# Patient Record
Sex: FEMALE | Race: AMERICAN INDIAN OR ALASKA NATIVE | NOT HISPANIC OR LATINO | Employment: FULL TIME | URBAN - METROPOLITAN AREA
[De-identification: names, ages, dates, MRNs, and addresses within clinical notes are randomized per-mention and may not be internally consistent; named-entity substitution may affect disease eponyms.]

---

## 2023-09-01 ENCOUNTER — HOSPITAL ENCOUNTER (OUTPATIENT)
Dept: DATA CONVERSION | Facility: HOSPITAL | Age: 48
End: 2023-09-01
Attending: ORTHOPAEDIC SURGERY | Admitting: ORTHOPAEDIC SURGERY
Payer: COMMERCIAL

## 2023-09-01 DIAGNOSIS — G56.02 CARPAL TUNNEL SYNDROME, LEFT UPPER LIMB: ICD-10-CM

## 2023-09-01 DIAGNOSIS — G56.03 CARPAL TUNNEL SYNDROME, BILATERAL UPPER LIMBS: ICD-10-CM

## 2023-09-29 VITALS — BODY MASS INDEX: 33.07 KG/M2 | WEIGHT: 168.43 LBS | HEIGHT: 60 IN

## 2023-09-30 NOTE — H&P
History of Present Illness:   Pregnant/Lactating:  ·  Are You Pregnant unable to answer   ·  Order Pregnancy Test order urine test   ·  Are You Currently Breastfeeding unable to answer     History Present Illness:  Reason for surgery: L CTS with possible concurrent  neuromuscular symptoms   HPI:    Pt with longstanding L CTS and possible concurrent neuromuscular etiologies.     Home Medication Review:   Home Medications Reviewed: yes     Impression/Procedure:   ·  Impression and Planned Procedure: L CTS, plan for open release       ERAS (Enhanced Recovery After Surgery):  ·  ERAS Patient: no       Physical Exam Narrative:  ·  Physical Exam:    - Constitutional: No acute distress, cooperative  - Eyes: EOM grossly intact  - Head/Neck: Trachea midline  - Respiratory/Thorax: NWOB  - Cardiovascular: RRR on peripheral palpation  - Gastrointestinal: Nondistended  - Psychological: Appropriate mood/behavior  - Skin: Warm and dry. Additional findings in musculoskeletal evaluation  - Musculoskeletal: moves extremities    Physical Exam by System:    Respiratory/Thorax: - Respiratory/Thorax: NWOB   Cardiovascular: - Cardiovascular: RRR on peripheral  palpation     Consent:   COVID-19 Consent:  ·  COVID-19 Risk Consent Surgeon has reviewed key risks related to the risk of meño COVID-19 and if they contract COVID-19 what the risks are.     Attestation:   Note Completion:  I am a:  Resident/Fellow   Attending Attestation I saw and evaluated the patient.  I personally obtained the key and critical portions of the history and physical exam or was physically present for key and  critical portions performed by the resident/fellow. I reviewed the resident/fellow?s documentation and discussed the patient with the resident/fellow.  I agree with the resident/fellow?s medical decision making as documented in the note.     I personally evaluated the patient on 31-Aug-2023         Electronic Signatures:  Kel Dewey (Resident))   (Signed 31-Aug-2023 21:57)   Authored: History of Present Illness, Home Medication  Review, Impression/Procedure, ERAS, Physical Exam, Consent, Note Completion  Curtis Xiong)  (Signed 01-Sep-2023 09:05)   Authored: Note Completion   Co-Signer: History of Present Illness, Home Medication Review, Impression/Procedure, ERAS, Physical Exam, Consent, Note Completion      Last Updated: 01-Sep-2023 09:05 by Curtis Xiong)

## 2023-10-01 NOTE — OP NOTE
PROCEDURE DETAILS    Preoperative Diagnosis:  Left carpal tunnel syndrome  Postoperative Diagnosis:  Same  Surgeon: Curtis Xiong MD  Resident/Fellow/Other Assistant: Kel Dewey MD PGY2    Procedure:  1. Left open carpal tunnel release   Anesthesia: Local  Estimated Blood Loss: <1cc  Blood Replaced: None  Findings: Transverse carpal ligament released  Specimens(s) Collected: no,     Complications: None immediate.   Drains and/or Catheters: None  Implants: None  Tourniquet Times: 6 minutes at 250mmHg  Patient Returned To/Condition: PACU in stable condition                                Attestation:   Note Completion:  Attending Attestation I was present for the entire procedure    I am a: Resident/Fellow         Electronic Signatures:  Kel Dewey (Resident))  (Signed 01-Sep-2023 10:52)   Authored: Post-Operative Note, Chart Review, Note Completion  Curtis Xiong)  (Signed 01-Sep-2023 14:10)   Authored: Note Completion   Co-Signer: Post-Operative Note, Chart Review, Note Completion      Last Updated: 01-Sep-2023 14:10 by Curtis Xiong)

## 2023-10-31 ENCOUNTER — OFFICE VISIT (OUTPATIENT)
Dept: ORTHOPEDIC SURGERY | Facility: CLINIC | Age: 48
End: 2023-10-31
Payer: COMMERCIAL

## 2023-10-31 DIAGNOSIS — G56.02 CARPAL TUNNEL SYNDROME, LEFT: Primary | ICD-10-CM

## 2023-10-31 PROCEDURE — 99024 POSTOP FOLLOW-UP VISIT: CPT | Performed by: ORTHOPAEDIC SURGERY

## 2023-10-31 NOTE — PROGRESS NOTES
Postoperative follow-up after left carpal tunnel decompression.  Date of surgery was September 1.  Overall she feels that she is much better.  She has no numbness and tingling and she only recalls 1 episode of cramping over the last 6 weeks.  She is able to use her hand for handwriting other activities with much greater ease.  The reason for today's visit is that she is concerned regarding some swelling at her wrist flexion crease in line with the palmaris longus tendon and wants to make sure that this is not a sign of anything going wrong.    Examination reveals that her palmar incision is well-healed.  She has no thenar atrophy.  She has minimal tenderness over the incision site.  She does have a scant amount of erythema with focal swelling at the palmaris longus at the level of the wrist flexion crease.  It is mildly tender to palpation.  No signs of infection.  Sensation intact to light touch.  Excellent  strength.    Impression: Left carpal tunnel syndrome.    Plan: I do not think this is anything to be overly concerned about.  Recommend that she continue with massage techniques.  We discussed that even though the incision is small in the palm her work extends all the way into the distal forearm in the region where she is mildly tender.  I suspect the symptoms will continue to improve with time.  I have no restrictions for her.  Return to see me as needed if she is further concerned with the appearance or symptoms of her left hand.    Curtis Xiong MD    [facility] [employer]  Department of Orthopaedic Surgery  Chief of Hand and Upper Extremity Surgery  [facility]    Dictation performed with the use of voice recognition software. Syntax and grammatical errors may exist.

## 2024-05-06 NOTE — OP NOTE
PREOPERATIVE DIAGNOSIS:  Left carpal tunnel syndrome.    POSTOPERATIVE DIAGNOSIS:  Left carpal tunnel syndrome.    OPERATION/PROCEDURE:  Left carpal tunnel release.    SURGEON:  Curtis Xiong MD.    ASSISTANT(S):  Kel Dewey.    ANESTHESIA:  Local only.    INDICATIONS:  A 48-year-old female with symptomatic carpal tunnel syndrome of left  upper extremity who presents today for left carpal tunnel release.  Preoperatively, the left hand was identified and marked for surgery.  Informed consent process was completed.     DESCRIPTION OF PROCEDURE:  The patient was brought to the operating room and placed supine on  the operating table.  A time-out procedure was performed to verify  correct patient, procedure, and operative site.  Local anesthetic  infiltrated into the left palm.  Left upper extremity was then  prepped and draped in usual sterile fashion.  Limb was exsanguinated  and a tourniquet was inflated to 250 mmHg.     A longitudinal incision was created in the central aspect of the  proximal palm.  Sharp dissection was carried through the superficial  palmar fascia.  Deep retractors were placed and the transverse carpal  ligament was exposed.  Under direct visualization, the ligament was  then divided longitudinally along its ulnar margin.  Complete median  nerve decompression was confirmed.  The wound was irrigated and then  closed in interrupted fashion.  A sterile bandage was applied and the  tourniquet was deflated.  The patient was transferred to the recovery  room in stable condition.     Postoperatively, she will be discharged home once comfortable.  She  can remove her bandage on postop day #4 and begin wound care with  gentle activities as instructed.  Return to clinic in 2 weeks for  wound check and advancement of her activities.     I am attending for the procedure and I was present for the entire  case.       Curtis Xiong MD    DD:  09/01/2023 11:31:56 EST  DT:  09/01/2023 11:47:31  EST  DICTATION NUMBER:  [id number]  INTERNAL JOB NUMBER:  [id number]    CC:  Curtis Xiong MD, Fax: [phone]        Electronic Signatures:  Curtis Xiong (MD) (Signed on 01-Sep-2023 14:16)   Authored  Unsigned, Draft (SYS GENERATED) (Entered on 01-Sep-2023 11:47)   Entered    Last Updated: 01-Sep-2023 14:16 by Curtis Xiong)

## 2024-10-08 ENCOUNTER — OFFICE (OUTPATIENT)
Dept: URBAN - METROPOLITAN AREA CLINIC 15 | Facility: CLINIC | Age: 49
End: 2024-10-08
Payer: MEDICARE

## 2024-10-08 VITALS — HEIGHT: 58 IN

## 2024-10-08 DIAGNOSIS — K76.0 FATTY (CHANGE OF) LIVER, NOT ELSEWHERE CLASSIFIED: ICD-10-CM

## 2024-10-08 PROCEDURE — 91200 LIVER ELASTOGRAPHY: CPT | Mod: SA | Performed by: INTERNAL MEDICINE

## 2025-07-15 ENCOUNTER — OFFICE (OUTPATIENT)
Dept: URBAN - METROPOLITAN AREA PATHOLOGY 6 | Facility: PATHOLOGY | Age: 50
End: 2025-07-15
Payer: MEDICARE

## 2025-07-15 ENCOUNTER — AMBULATORY SURGICAL CENTER (OUTPATIENT)
Dept: URBAN - METROPOLITAN AREA SURGERY 6 | Facility: SURGERY | Age: 50
End: 2025-07-15
Payer: MEDICARE

## 2025-07-15 VITALS
SYSTOLIC BLOOD PRESSURE: 99 MMHG | HEIGHT: 59 IN | HEART RATE: 87 BPM | OXYGEN SATURATION: 98 % | WEIGHT: 173 LBS | OXYGEN SATURATION: 99 % | HEART RATE: 77 BPM | OXYGEN SATURATION: 99 % | DIASTOLIC BLOOD PRESSURE: 61 MMHG | HEART RATE: 87 BPM | OXYGEN SATURATION: 98 % | HEART RATE: 77 BPM | SYSTOLIC BLOOD PRESSURE: 101 MMHG | SYSTOLIC BLOOD PRESSURE: 126 MMHG | DIASTOLIC BLOOD PRESSURE: 75 MMHG | SYSTOLIC BLOOD PRESSURE: 116 MMHG | OXYGEN SATURATION: 96 % | DIASTOLIC BLOOD PRESSURE: 61 MMHG | DIASTOLIC BLOOD PRESSURE: 77 MMHG | DIASTOLIC BLOOD PRESSURE: 79 MMHG | DIASTOLIC BLOOD PRESSURE: 75 MMHG | OXYGEN SATURATION: 100 % | OXYGEN SATURATION: 93 % | SYSTOLIC BLOOD PRESSURE: 102 MMHG | SYSTOLIC BLOOD PRESSURE: 99 MMHG | SYSTOLIC BLOOD PRESSURE: 126 MMHG | DIASTOLIC BLOOD PRESSURE: 79 MMHG | SYSTOLIC BLOOD PRESSURE: 99 MMHG | OXYGEN SATURATION: 93 % | HEIGHT: 59 IN | SYSTOLIC BLOOD PRESSURE: 116 MMHG | OXYGEN SATURATION: 98 % | SYSTOLIC BLOOD PRESSURE: 101 MMHG | HEART RATE: 76 BPM | SYSTOLIC BLOOD PRESSURE: 126 MMHG | HEART RATE: 79 BPM | DIASTOLIC BLOOD PRESSURE: 77 MMHG | HEART RATE: 82 BPM | HEART RATE: 76 BPM | RESPIRATION RATE: 9 BRPM | DIASTOLIC BLOOD PRESSURE: 90 MMHG | RESPIRATION RATE: 9 BRPM | RESPIRATION RATE: 19 BRPM | DIASTOLIC BLOOD PRESSURE: 94 MMHG | WEIGHT: 173 LBS | DIASTOLIC BLOOD PRESSURE: 94 MMHG | OXYGEN SATURATION: 100 % | TEMPERATURE: 97.5 F | RESPIRATION RATE: 18 BRPM | SYSTOLIC BLOOD PRESSURE: 116 MMHG | SYSTOLIC BLOOD PRESSURE: 101 MMHG | HEART RATE: 87 BPM | OXYGEN SATURATION: 96 % | TEMPERATURE: 97.5 F | HEIGHT: 59 IN | DIASTOLIC BLOOD PRESSURE: 75 MMHG | TEMPERATURE: 97.5 F | HEART RATE: 82 BPM | RESPIRATION RATE: 18 BRPM | RESPIRATION RATE: 16 BRPM | DIASTOLIC BLOOD PRESSURE: 90 MMHG | RESPIRATION RATE: 18 BRPM | OXYGEN SATURATION: 99 % | OXYGEN SATURATION: 96 % | HEART RATE: 77 BPM | DIASTOLIC BLOOD PRESSURE: 77 MMHG | DIASTOLIC BLOOD PRESSURE: 94 MMHG | RESPIRATION RATE: 19 BRPM | HEART RATE: 76 BPM | SYSTOLIC BLOOD PRESSURE: 102 MMHG | RESPIRATION RATE: 9 BRPM | DIASTOLIC BLOOD PRESSURE: 61 MMHG | HEART RATE: 82 BPM | SYSTOLIC BLOOD PRESSURE: 102 MMHG | RESPIRATION RATE: 19 BRPM | OXYGEN SATURATION: 100 % | OXYGEN SATURATION: 93 % | HEART RATE: 79 BPM | DIASTOLIC BLOOD PRESSURE: 79 MMHG | DIASTOLIC BLOOD PRESSURE: 90 MMHG | WEIGHT: 173 LBS | HEART RATE: 79 BPM | RESPIRATION RATE: 16 BRPM | RESPIRATION RATE: 16 BRPM

## 2025-07-15 DIAGNOSIS — K21.00 GASTRO-ESOPHAGEAL REFLUX DISEASE WITH ESOPHAGITIS, WITHOUT B: ICD-10-CM

## 2025-07-15 DIAGNOSIS — K31.89 OTHER DISEASES OF STOMACH AND DUODENUM: ICD-10-CM

## 2025-07-15 DIAGNOSIS — K22.70 BARRETT'S ESOPHAGUS WITHOUT DYSPLASIA: ICD-10-CM

## 2025-07-15 DIAGNOSIS — Z87.19 PERSONAL HISTORY OF OTHER DISEASES OF THE DIGESTIVE SYSTEM: ICD-10-CM

## 2025-07-15 DIAGNOSIS — K20.80 OTHER ESOPHAGITIS WITHOUT BLEEDING: ICD-10-CM

## 2025-07-15 PROBLEM — K44.9 DIAPHRAGMATIC HERNIA WITHOUT OBSTRUCTION OR GANGRENE: Status: ACTIVE | Noted: 2025-07-15

## 2025-07-15 PROBLEM — K31.7 POLYP OF STOMACH AND DUODENUM: Status: ACTIVE | Noted: 2025-07-15

## 2025-07-15 PROBLEM — K29.70 GASTRITIS, UNSPECIFIED, WITHOUT BLEEDING: Status: ACTIVE | Noted: 2025-07-15

## 2025-07-15 PROBLEM — K22.89 OTHER SPECIFIED DISEASE OF ESOPHAGUS: Status: ACTIVE | Noted: 2025-07-15

## 2025-07-15 PROCEDURE — 88305 TISSUE EXAM BY PATHOLOGIST: CPT | Performed by: PATHOLOGY

## 2025-07-15 PROCEDURE — 43239 EGD BIOPSY SINGLE/MULTIPLE: CPT | Performed by: INTERNAL MEDICINE

## 2025-07-15 PROCEDURE — 88342 IMHCHEM/IMCYTCHM 1ST ANTB: CPT | Performed by: PATHOLOGY

## 2025-07-15 PROCEDURE — 88313 SPECIAL STAINS GROUP 2: CPT | Performed by: PATHOLOGY

## 2025-08-05 PROBLEM — K20.80 OTHER ESOPHAGITIS WITHOUT BLEEDING: Status: ACTIVE | Noted: 2025-07-15

## 2025-08-05 PROBLEM — G43.909 MIGRAINE: Status: ACTIVE | Noted: 2021-12-08

## 2025-08-05 PROBLEM — K22.89 OTHER SPECIFIED DISEASE OF ESOPHAGUS: Status: ACTIVE | Noted: 2025-07-15

## 2025-08-05 PROBLEM — B00.9 HERPES SIMPLEX: Status: ACTIVE | Noted: 2021-12-08

## 2025-08-05 PROBLEM — G47.00 INSOMNIA: Status: ACTIVE | Noted: 2021-12-08

## 2025-08-05 PROBLEM — F31.9 BIPOLAR I DISORDER: Status: ACTIVE | Noted: 2021-12-08

## 2025-08-05 PROBLEM — G25.81 RESTLESS LEGS: Status: ACTIVE | Noted: 2021-12-08

## 2025-08-05 PROBLEM — R00.0 TACHYCARDIA: Status: ACTIVE | Noted: 2021-12-08

## 2025-08-05 PROBLEM — K29.70 GASTRITIS, UNSPECIFIED, WITHOUT BLEEDING: Status: ACTIVE | Noted: 2025-07-15

## 2025-08-11 ENCOUNTER — OFFICE (OUTPATIENT)
Dept: URBAN - METROPOLITAN AREA CLINIC 15 | Facility: CLINIC | Age: 50
End: 2025-08-11
Payer: MEDICAID

## 2025-08-11 VITALS
SYSTOLIC BLOOD PRESSURE: 120 MMHG | OXYGEN SATURATION: 95 % | HEIGHT: 59 IN | TEMPERATURE: 97.6 F | DIASTOLIC BLOOD PRESSURE: 80 MMHG | WEIGHT: 178.8 LBS | HEART RATE: 81 BPM

## 2025-08-11 DIAGNOSIS — R74.8 ABNORMAL LEVELS OF OTHER SERUM ENZYMES: ICD-10-CM

## 2025-08-11 DIAGNOSIS — Z80.0 FAMILY HISTORY OF MALIGNANT NEOPLASM OF DIGESTIVE ORGANS: ICD-10-CM

## 2025-08-11 DIAGNOSIS — K22.70 BARRETT'S ESOPHAGUS WITHOUT DYSPLASIA: ICD-10-CM

## 2025-08-11 DIAGNOSIS — Z86.0100 PERSONAL HISTORY OF COLON POLYPS, UNSPECIFIED: ICD-10-CM

## 2025-08-11 DIAGNOSIS — K59.03 DRUG INDUCED CONSTIPATION: ICD-10-CM

## 2025-08-11 DIAGNOSIS — K76.0 FATTY (CHANGE OF) LIVER, NOT ELSEWHERE CLASSIFIED: ICD-10-CM

## 2025-08-11 PROCEDURE — 99214 OFFICE O/P EST MOD 30 MIN: CPT | Mod: SA | Performed by: NURSE PRACTITIONER
